# Patient Record
Sex: FEMALE | Race: AMERICAN INDIAN OR ALASKA NATIVE
[De-identification: names, ages, dates, MRNs, and addresses within clinical notes are randomized per-mention and may not be internally consistent; named-entity substitution may affect disease eponyms.]

---

## 2017-02-07 ENCOUNTER — HOSPITAL ENCOUNTER (EMERGENCY)
Dept: HOSPITAL 5 - ED | Age: 52
LOS: 1 days | Discharge: HOME | End: 2017-02-08
Payer: COMMERCIAL

## 2017-02-07 DIAGNOSIS — J40: Primary | ICD-10-CM

## 2017-02-07 PROCEDURE — 99282 EMERGENCY DEPT VISIT SF MDM: CPT

## 2017-02-08 VITALS — DIASTOLIC BLOOD PRESSURE: 83 MMHG | SYSTOLIC BLOOD PRESSURE: 128 MMHG

## 2017-02-08 NOTE — EMERGENCY DEPARTMENT REPORT
- General


Chief Complaint: Upper Respiratory Infection


Stated Complaint: CHEST CONGESTION, LEFT EAR PAIN


Source: patient


Mode of arrival: Ambulatory


Limitations: No Limitations





- History of Present Illness


Initial Comments: 





51-year-old female comes in for complaint of cough for greater than 2 weeks 

with yellow sputum chest congestion about 3 days and also left ear pain.  

Patient reports this is been taking Mucinex and Claritin without much relief.  

Fever last week no fever today denies any nausea no vomiting





- Related Data


 Previous Rx's











 Medication  Instructions  Recorded  Last Taken  Type


 


Sulfamethoxazole/Trimethoprim 1 each PO BID #10 tablet 12/27/15 Unknown Rx





[Bactrim DS TAB]    


 


hydrOXYzine PAMOATE [Vistaril] 25 mg PO Q6HR PRN #30 capsule 12/27/15 Unknown Rx


 


Amoxicillin [Trimox CAP] 500 mg PO Q8H #30 capsule 07/18/16 Unknown Rx


 


Loratadine [Claritin] 10 mg PO DAILY #30 tablet 07/18/16 Unknown Rx


 


Prednisone [predniSONE 10 mg 10 mg PO .TAPER #1 tab.ds.pk 07/18/16 Unknown Rx





(6-Day Pack, 21 Tabs)]    


 


Promethazine /Codeine 5 ml PO Q6H PRN #100 ml 07/18/16 Unknown Rx





[Phenergan/Codeine 6.25-10 mg/5 ml]    


 


Cyclobenzaprine [Flexeril] 10 mg PO TID #20 tablet 10/30/16 Unknown Rx


 


Ibuprofen [Motrin 800 MG tab] 800 mg PO Q8HR PRN #30 tablet 10/30/16 Unknown Rx


 


Azithromycin [Zithromax] 250 mg PO QDAY #6 tablet 02/08/17 Unknown Rx


 


Promethazine /Codeine 5 ml PO Q6H PRN #100 ml 02/08/17 Unknown Rx





[Phenergan/Codeine 6.25-10 mg/5 ml]    











 Allergies











Allergy/AdvReac Type Severity Reaction Status Date / Time


 


No Known Allergies Allergy   Verified 07/18/16 01:51














ED Review of Systems


ROS: 


Stated complaint: CHEST CONGESTION, LEFT EAR PAIN


Other details as noted in HPI





Constitutional: chills, fever


ENT: ear pain, throat pain, congestion


Respiratory: cough





ED Past Medical Hx





- Past Medical History


Previous Medical History?: No


Hx Hypertension: No


Hx CVA: No


Hx Heart Attack/AMI: No


Hx Congestive Heart Failure: No


Hx Diabetes: No


Hx Deep Vein Thrombosis: No


Hx Pulmonary Embolism: No


Hx GERD: No


Hx Liver Disease: No


Hx Renal Disease: No


Hx Sickle Cell Disease: No


Hx Arthritis: No


Hx Headaches / Migraines: No


Hx Seizures: No


Hx Kidney Stones: No


Hx Psychiatric Treatment: No


Hx Asthma: No


Hx COPD: No


Hx Tuberculosis: No


Hx Dementia: No


Hx HIV: No





- Surgical History


Past Surgical History?: Yes


Hx Coronary Stent: No


Hx Open Heart Surgery: No


Hx Pacemaker: No


Hx Internal Defibrillator: No


Hx Cholecystectomy: No


Hx Appendectomy: No


Hx Breast Surgery: No


Additional Surgical History: Tummy Tuck,Breast lift





- Social History


Smoking Status: Never Smoker


Substance Use Type: None





- Medications


Home Medications: 


 Home Medications











 Medication  Instructions  Recorded  Confirmed  Last Taken  Type


 


Sulfamethoxazole/Trimethoprim 1 each PO BID #10 tablet 12/27/15  Unknown Rx





[Bactrim DS TAB]     


 


hydrOXYzine PAMOATE [Vistaril] 25 mg PO Q6HR PRN #30 capsule 12/27/15  Unknown 

Rx


 


Amoxicillin [Trimox CAP] 500 mg PO Q8H #30 capsule 07/18/16  Unknown Rx


 


Loratadine [Claritin] 10 mg PO DAILY #30 tablet 07/18/16  Unknown Rx


 


Prednisone [predniSONE 10 mg 10 mg PO .TAPER #1 tab.ds.pk 07/18/16  Unknown Rx





(6-Day Pack, 21 Tabs)]     


 


Promethazine /Codeine 5 ml PO Q6H PRN #100 ml 07/18/16  Unknown Rx





[Phenergan/Codeine 6.25-10 mg/5 ml]     


 


Cyclobenzaprine [Flexeril] 10 mg PO TID #20 tablet 10/30/16  Unknown Rx


 


Ibuprofen [Motrin 800 MG tab] 800 mg PO Q8HR PRN #30 tablet 10/30/16  Unknown Rx


 


Azithromycin [Zithromax] 250 mg PO QDAY #6 tablet 02/08/17  Unknown Rx


 


Promethazine /Codeine 5 ml PO Q6H PRN #100 ml 02/08/17  Unknown Rx





[Phenergan/Codeine 6.25-10 mg/5 ml]     














ED Physical Exam





- General


Limitations: No Limitations


General appearance: alert, in no apparent distress





- Head


Head exam: Present: atraumatic, normocephalic





- Eye


Eye exam: Present: normal appearance, PERRL, EOMI


Pupils: Present: normal accommodation





- ENT


ENT exam: Present: mucous membranes moist, normal external ear exam





- Expanded ENT Exam


  ** Expanded


Ear exam: Present: normal external inspection


TM/Canal exam: Erythema: Left TM, Mastoid Tenderness: Right TM, Left TM


Throat exam: Positive: tonsillar erythema





- Neck


Neck exam: Present: normal inspection, full ROM.  Absent: tenderness, 

lymphadenopathy





- Respiratory


Respiratory exam: Present: normal lung sounds bilaterally.  Absent: respiratory 

distress, wheezes, rales, rhonchi, chest wall tenderness





- Cardiovascular


Cardiovascular Exam: Present: regular rate, normal rhythm, normal heart sounds





ED Course





 Vital Signs











  02/07/17





  22:48


 


Temperature 98.5 F


 


Pulse Rate 84


 


Respiratory 18





Rate 


 


Blood Pressure 131/85


 


O2 Sat by Pulse 97





Oximetry 














ED Medical Decision Making





- Medical Decision Making





Patient been evaluated by this provider in fast track.  Based on history we'll 

treat patient for acute bronchitis.  Patient verbalized understanding.


Critical care attestation.: 


If time is entered above; I have spent that time in minutes in the direct care 

of this critically ill patient, excluding procedure time.








ED Disposition


Clinical Impression: 


 Bronchitis





Disposition: DISCHARGED TO HOME OR SELFCARE


Is pt being admited?: No


Does the pt Need Aspirin: No


Condition: Stable


Instructions:  Chronic Bronchitis (ED)


Additional Instructions: 


Complete Antibiotics and cough medicine as prescribed.


Prescriptions: 


Azithromycin [Zithromax] 250 mg PO QDAY #6 tablet


Promethazine /Codeine [Phenergan/Codeine 6.25-10 mg/5 ml] 5 ml PO Q6H PRN #100 

ml


 PRN Reason: cough


Referrals: 


PRIMARY CARE,MD [Primary Care Provider] - 3-5 Days


Forms:  Work/School Release Form(ED)

## 2017-12-27 NOTE — MAMMOGRAPHY REPORT
BILATERAL MAMMOGRAM with CAD:



HISTORY:

Cancer screening. Comparison study is dated December 7, 2016.



FINDINGS:

The breast tissue is heterogeneously dense, which could obscure

detection of small masses (approximately 50%-75% glandular).  No mass, 

distortion, suspicious calcification, or skin change is seen.



IMPRESSION:

Negative mammogram.  There is no mammographic evidence of

malignancy.



RECOMMENDATION:

Follow-up per ACS guidelines.



BI-RADS CATEGORY:  1 = Negative



ACR BI-RADS MAMMOGRAPHIC CODES:

0 = Needs additional imaging evaluation; 1 = Negative; 2 = Benign; 3 = 

Probably benign; 4 = Suspicious; 5 = Malignant; 6 = Known biopsy-proven 

malignancy



COMMENT:

      1.   Dense breast tissue, i.e., adenosis, fibrocystic 

            changes, etc., may obscure an underlying neoplasm.

      2.   Approximately 10% of cancers are not detected with

            mammography.

      3.   A negative mammography report should not delay biopsy 

            if a clinically suspicious mass is present.



COMMENT:

Patient follow-up letters are generated in [employer].

## 2020-02-04 NOTE — MAMMOGRAPHY REPORT
DIGITAL SCREENING MAMMOGRAM WITH CAD, 2/4/2020



INDICATION: Routine screening mammography. 



TECHNIQUE:  Digital bilateral  2D mammography was obtained in the craniocaudal and mediolateral obliq
ue projections. This examination was interpreted with the benefit of Computer-Aided Detection analysi
s.



COMPARISON: 1/11/2019 and mammograms going back to 10/30/2013



FINDINGS: 



Breast Density: The breasts are heterogeneously dense, which may obscure small masses.



A right asymmetry with architectural distortion on the CC view and right asymmetry on the MLO view re
quire additional imaging. No suspicious calcifications of the right breast. There is no evidence of d
ominant mass, suspicious calcifications or architectural distortion in the left breast.



IMPRESSION: Right asymmetries and architectural distortion requiring additional imaging. Recommend re
call for right lateral medial and spot compression MLO and CC views and right breast ultrasound if ne
eded.



Follow up recommendation: Special View: Spot



Category 0: Incomplete. Needs additional imaging evaluation and/or prior mammograms for comparison.



A "normal" or negative report should not discourage follow up or biopsy of a clinically significant f
inding.



A written summary of these findings will be mailed to the patient. The patient will be entered into a
 mammography reporting system which will generate a reminder letter for the patient's next appointmen
t at the appropriate interval.



The American College of Radiology recommends yearly mammograms starting at age 40 and continuing as l
desiree as a woman is in good health.  Breast MRI is recommended for women with an approximate 20-25% or 
greater lifetime risk of breast cancer, including women with a strong family history of breast or ova
cameron cancer or who have been treated for Hodgkin's disease.



Signer Name: Manjit Naqvi MD 

Signed: 2/4/2020 8:25 AM

 Workstation Name: [id number]

## 2020-02-18 NOTE — MAMMOGRAPHY REPORT
DIGITAL DIAGNOSTIC MAMMOGRAM WITH CAD, 2/18/2020



INDICATION: Recalled for asymmetries and architectural distortion. R92.2/RT BREAST SPOT COMPRESSION



TECHNIQUE:  Digital right mammographic imaging was performed. Spot compression views were obtained.

This examination was interpreted with the benefit of Computer-aided Detection analysis. 



COMPARISON: 2/4/2020



FINDINGS: 



Breast Density: The breasts are heterogeneously dense, which may obscure small masses.



Lateral medial and spot compression MLO and CC views were performed and are negative. Satisfactory ef
facement of asymmetries and architectural distortion.



IMPRESSION: No mammographic evidence of malignancy.





Follow up recommendation: Routine yearly



BI-RADS Category 2:  Benign.



A "normal" or negative report should not discourage follow up or biopsy of a clinically significant f
inding.



A written summary of these findings will be mailed to the patient. The patient will be entered into a
 mammography reporting system which will generate a reminder letter for the patient's next appointmen
t at the appropriate interval.



According to the American College of Radiology, yearly mammograms are recommended starting at age 40 
and continuing as long as a woman is in good health.  Breast MRI is recommended for women with an randa
roximately 20-25% or greater lifetime risk of breast cancer, including women with a strong family his
tory of breast or ovarian cancer and women who have been treated for Hodgkin's disease.



Signer Name: Manjit Naqvi MD 

Signed: 2/18/2020 2:15 PM

 Workstation Name: [id number]

## 2020-07-07 ENCOUNTER — OFFICE VISIT (OUTPATIENT)
Dept: URBAN - METROPOLITAN AREA TELEHEALTH 2 | Facility: TELEHEALTH | Age: 55
End: 2020-07-07
Payer: COMMERCIAL

## 2020-07-07 ENCOUNTER — DASHBOARD ENCOUNTERS (OUTPATIENT)
Age: 55
End: 2020-07-07

## 2020-07-07 ENCOUNTER — LAB OUTSIDE AN ENCOUNTER (OUTPATIENT)
Dept: URBAN - METROPOLITAN AREA TELEHEALTH 2 | Facility: TELEHEALTH | Age: 55
End: 2020-07-07

## 2020-07-07 DIAGNOSIS — K21.9 GERD (GASTROESOPHAGEAL REFLUX DISEASE): ICD-10-CM

## 2020-07-07 PROCEDURE — 99203 OFFICE O/P NEW LOW 30 MIN: CPT | Performed by: INTERNAL MEDICINE

## 2020-07-07 NOTE — HPI-TODAY'S VISIT:
The patient has been referred for persistent reflux symptoms that have been refractory to medications.  She has had acid reflux symptoms for several years worsening in recent months.  Her main complaint is heartburn but she has regurgitation spells every 5 or 6 days but no anderson vomiting.  She denies any hematemesis or melena and denies any weight loss.  She has had no dysphagia.  Heartburn is severe at times.  She has been taking pantoprazole 40 mg in the morning and famotidine 40 mg before bedtime.  Symptoms have not been completely relieved with this.  She also takes a muscle relaxer, cyclobenzaprine in the evening which she says is of some benefit. The patient has been avoiding spicy foods and caffeine in her diet and avoids fried foods.  She is a non-smoker and has occasional alcohol intake about once a month.  She has had no previous endoscopy studies. Her last colonoscopy was 5 years ago which she reports was normal.  There is no family history of any polyps or gastrointestinal malignancies.

## 2020-07-09 ENCOUNTER — CLAIMS CREATED FROM THE CLAIM WINDOW (OUTPATIENT)
Dept: URBAN - METROPOLITAN AREA CLINIC 4 | Facility: CLINIC | Age: 55
End: 2020-07-09
Payer: COMMERCIAL

## 2020-07-09 ENCOUNTER — OFFICE VISIT (OUTPATIENT)
Dept: URBAN - METROPOLITAN AREA SURGERY CENTER 23 | Facility: SURGERY CENTER | Age: 55
End: 2020-07-09
Payer: COMMERCIAL

## 2020-07-09 DIAGNOSIS — K29.70 GASTRITIS, UNSPECIFIED, WITHOUT BLEEDING: ICD-10-CM

## 2020-07-09 DIAGNOSIS — K29.60 ADENOPAPILLOMATOSIS GASTRICA: ICD-10-CM

## 2020-07-09 PROCEDURE — G8907 PT DOC NO EVENTS ON DISCHARG: HCPCS | Performed by: INTERNAL MEDICINE

## 2020-07-09 PROCEDURE — 43239 EGD BIOPSY SINGLE/MULTIPLE: CPT | Performed by: INTERNAL MEDICINE

## 2020-07-09 PROCEDURE — 88312 SPECIAL STAINS GROUP 1: CPT | Performed by: PATHOLOGY

## 2020-07-09 PROCEDURE — 88305 TISSUE EXAM BY PATHOLOGIST: CPT | Performed by: PATHOLOGY

## 2021-03-01 NOTE — MAMMOGRAPHY REPORT
DIGITAL SCREENING MAMMOGRAM WITH CAD, 3/1/2021



INDICATION: Routine screening mammography. 



TECHNIQUE:  Digital bilateral  2D mammography was obtained in the craniocaudal and mediolateral obliq
ue projections. This examination was interpreted with the benefit of Computer-Aided Detection analysi
s.



COMPARISON: 2/4/2020.



FINDINGS: 



Breast Density: The breasts are heterogeneously dense, which may obscure small masses.



There is no evidence of dominant mass, suspicious calcifications or architectural distortion in the l
eft breast. Increasing calcification central right breast.



IMPRESSION:



Follow up recommendation: Special View: Mag



BI-RADS Category 0: Incomplete. Needs additional imaging evaluation and/or prior mammograms for orly
rison.



A "normal" or negative report should not discourage follow up or biopsy of a clinically significant f
inding.



A written summary of these findings will be mailed to the patient. The patient will be entered into a
 mammography reporting system which will generate a reminder letter for the patient's next appointmen
t at the appropriate interval.



The American College of [employer]iology recommends yearly mammograms starting at age 40 and continuing as l
desiree as a woman is in good health.  Breast MRI is recommended for women with an approximate 20-25% or 
greater lifetime risk of breast cancer, including women with a strong family history of breast or ova
cameron cancer or who have been treated for Hodgkin's disease.



Signer Name: Shoaib Villaseñor MD 

Signed: 3/1/2021 9:49 AM

Workstation Name: [email or web address]

## 2022-05-24 NOTE — MAMMOGRAPHY REPORT
DIGITAL SCREENING MAMMOGRAM WITH CAD, 5/23/2022



CLINICAL INFORMATION / INDICATION: Routine screening mammography.



TECHNIQUE:  Digital bilateral 2D mammography was obtained in the craniocaudal and mediolateral obliqu
e projections. This examination was interpreted with the benefit of Computer-Aided Detection analysis
.



COMPARISON: 3/1/2021, 2/4/2020



FINDINGS: 



Breast Density: The breasts are heterogeneously dense, which may obscure small masses.



No dominant mass, suspicious calcifications, or architectural distortion in either breast. 



Overall, no interval change.

 

IMPRESSION: No mammographic evidence of malignancy.



Follow up recommendation: Routine yearly screening mammogram.



BI-RADS Category 1:  NEGATIVE





-------------------------------------------------------------------------------------------

A "normal" or negative report should not discourage follow up or biopsy of a clinically significant f
inding.



A written summary of these findings will be mailed to the patient. The patient will be entered into a
 mammography reporting system which will generate a reminder letter for the patient's next appointmen
t at the appropriate interval.



The [employer] recommends yearly mammograms starting at age 40 and continuing as l
desiree as a woman is in good health.  Breast MRI is recommended for women with an approximate 20-25% or 
greater lifetime risk of breast cancer, including women with a strong family history of breast or ova
cameron cancer or who have been treated for Hodgkin's disease.



Signer Name: Yuliana Ramirez MD 

Signed: 5/24/2022 5:44 PM

Workstation Name: [id number]

## 2022-06-22 ENCOUNTER — OFFICE VISIT (OUTPATIENT)
Dept: URBAN - METROPOLITAN AREA CLINIC 118 | Facility: CLINIC | Age: 57
End: 2022-06-22

## 2022-08-31 ENCOUNTER — OFFICE VISIT (OUTPATIENT)
Dept: URBAN - METROPOLITAN AREA CLINIC 118 | Facility: CLINIC | Age: 57
End: 2022-08-31